# Patient Record
Sex: MALE | Race: WHITE | URBAN - NONMETROPOLITAN AREA
[De-identification: names, ages, dates, MRNs, and addresses within clinical notes are randomized per-mention and may not be internally consistent; named-entity substitution may affect disease eponyms.]

---

## 2019-01-09 ENCOUNTER — APPOINTMENT (RX ONLY)
Dept: URBAN - NONMETROPOLITAN AREA CLINIC 1 | Facility: CLINIC | Age: 59
Setting detail: DERMATOLOGY
End: 2019-01-09

## 2019-01-09 DIAGNOSIS — D18.0 HEMANGIOMA: ICD-10-CM

## 2019-01-09 DIAGNOSIS — D22 MELANOCYTIC NEVI: ICD-10-CM

## 2019-01-09 DIAGNOSIS — L82.1 OTHER SEBORRHEIC KERATOSIS: ICD-10-CM

## 2019-01-09 DIAGNOSIS — L73.9 FOLLICULAR DISORDER, UNSPECIFIED: ICD-10-CM

## 2019-01-09 DIAGNOSIS — L738 OTHER SPECIFIED DISEASES OF HAIR AND HAIR FOLLICLES: ICD-10-CM

## 2019-01-09 DIAGNOSIS — L81.4 OTHER MELANIN HYPERPIGMENTATION: ICD-10-CM

## 2019-01-09 DIAGNOSIS — L663 OTHER SPECIFIED DISEASES OF HAIR AND HAIR FOLLICLES: ICD-10-CM

## 2019-01-09 PROBLEM — L02.12 FURUNCLE OF NECK: Status: ACTIVE | Noted: 2019-01-09

## 2019-01-09 PROBLEM — I10 ESSENTIAL (PRIMARY) HYPERTENSION: Status: ACTIVE | Noted: 2019-01-09

## 2019-01-09 PROBLEM — D18.01 HEMANGIOMA OF SKIN AND SUBCUTANEOUS TISSUE: Status: ACTIVE | Noted: 2019-01-09

## 2019-01-09 PROBLEM — D22.5 MELANOCYTIC NEVI OF TRUNK: Status: ACTIVE | Noted: 2019-01-09

## 2019-01-09 PROCEDURE — ? OBSERVATION AND MEASURE

## 2019-01-09 PROCEDURE — 99213 OFFICE O/P EST LOW 20 MIN: CPT

## 2019-01-09 PROCEDURE — ? COUNSELING

## 2019-01-09 PROCEDURE — ? ADDITIONAL NOTES

## 2019-01-09 ASSESSMENT — LOCATION ZONE DERM
LOCATION ZONE: TRUNK
LOCATION ZONE: ARM
LOCATION ZONE: NECK

## 2019-01-09 ASSESSMENT — LOCATION SIMPLE DESCRIPTION DERM
LOCATION SIMPLE: POSTERIOR NECK
LOCATION SIMPLE: RIGHT SHOULDER
LOCATION SIMPLE: LOWER BACK
LOCATION SIMPLE: LEFT LOWER BACK
LOCATION SIMPLE: LEFT UPPER BACK

## 2019-01-09 ASSESSMENT — LOCATION DETAILED DESCRIPTION DERM
LOCATION DETAILED: RIGHT POSTERIOR NECK
LOCATION DETAILED: LEFT MID-UPPER BACK
LOCATION DETAILED: LEFT SUPERIOR LATERAL LOWER BACK
LOCATION DETAILED: LEFT MEDIAL UPPER BACK
LOCATION DETAILED: LEFT SUPERIOR UPPER BACK
LOCATION DETAILED: LEFT SUPERIOR MEDIAL MIDBACK
LOCATION DETAILED: RIGHT ANTERIOR SHOULDER
LOCATION DETAILED: RIGHT SUPERIOR POSTERIOR NECK
LOCATION DETAILED: SUPERIOR LUMBAR SPINE

## 2019-02-21 ENCOUNTER — APPOINTMENT (RX ONLY)
Dept: URBAN - NONMETROPOLITAN AREA CLINIC 1 | Facility: CLINIC | Age: 59
Setting detail: DERMATOLOGY
End: 2019-02-21

## 2019-02-21 DIAGNOSIS — D18.0 HEMANGIOMA: ICD-10-CM

## 2019-02-21 PROBLEM — D18.01 HEMANGIOMA OF SKIN AND SUBCUTANEOUS TISSUE: Status: ACTIVE | Noted: 2019-02-21

## 2019-02-21 PROCEDURE — ? DIOLITE 532 LASER

## 2019-02-21 ASSESSMENT — LOCATION SIMPLE DESCRIPTION DERM
LOCATION SIMPLE: LEFT CHEEK
LOCATION SIMPLE: RIGHT CHEEK

## 2019-02-21 ASSESSMENT — LOCATION DETAILED DESCRIPTION DERM
LOCATION DETAILED: RIGHT CENTRAL MALAR CHEEK
LOCATION DETAILED: LEFT CENTRAL MALAR CHEEK

## 2019-02-21 ASSESSMENT — LOCATION ZONE DERM: LOCATION ZONE: FACE

## 2020-03-16 ENCOUNTER — APPOINTMENT (RX ONLY)
Dept: URBAN - NONMETROPOLITAN AREA CLINIC 1 | Facility: CLINIC | Age: 60
Setting detail: DERMATOLOGY
End: 2020-03-16

## 2020-03-16 DIAGNOSIS — L82.1 OTHER SEBORRHEIC KERATOSIS: ICD-10-CM

## 2020-03-16 DIAGNOSIS — D18.0 HEMANGIOMA: ICD-10-CM

## 2020-03-16 DIAGNOSIS — L81.4 OTHER MELANIN HYPERPIGMENTATION: ICD-10-CM

## 2020-03-16 DIAGNOSIS — D22 MELANOCYTIC NEVI: ICD-10-CM

## 2020-03-16 PROBLEM — D18.01 HEMANGIOMA OF SKIN AND SUBCUTANEOUS TISSUE: Status: ACTIVE | Noted: 2020-03-16

## 2020-03-16 PROBLEM — D22.72 MELANOCYTIC NEVI OF LEFT LOWER LIMB, INCLUDING HIP: Status: ACTIVE | Noted: 2020-03-16

## 2020-03-16 PROBLEM — D22.5 MELANOCYTIC NEVI OF TRUNK: Status: ACTIVE | Noted: 2020-03-16

## 2020-03-16 PROCEDURE — ? OBSERVATION AND MEASURE

## 2020-03-16 PROCEDURE — ? FULL BODY SKIN EXAM

## 2020-03-16 PROCEDURE — ? COUNSELING

## 2020-03-16 PROCEDURE — 99213 OFFICE O/P EST LOW 20 MIN: CPT

## 2020-03-16 ASSESSMENT — LOCATION ZONE DERM
LOCATION ZONE: TRUNK
LOCATION ZONE: LEG

## 2020-03-16 ASSESSMENT — LOCATION DETAILED DESCRIPTION DERM
LOCATION DETAILED: LEFT ANTERIOR PROXIMAL THIGH
LOCATION DETAILED: LEFT ANTERIOR DISTAL THIGH
LOCATION DETAILED: LEFT MID-UPPER BACK
LOCATION DETAILED: LEFT MEDIAL UPPER BACK
LOCATION DETAILED: LEFT SUPERIOR UPPER BACK

## 2020-03-16 ASSESSMENT — LOCATION SIMPLE DESCRIPTION DERM
LOCATION SIMPLE: LEFT UPPER BACK
LOCATION SIMPLE: LEFT THIGH

## 2021-03-15 ENCOUNTER — APPOINTMENT (RX ONLY)
Dept: URBAN - NONMETROPOLITAN AREA CLINIC 1 | Facility: CLINIC | Age: 61
Setting detail: DERMATOLOGY
End: 2021-03-15

## 2021-03-15 DIAGNOSIS — L81.4 OTHER MELANIN HYPERPIGMENTATION: ICD-10-CM | Status: WELL CONTROLLED

## 2021-03-15 DIAGNOSIS — B07.8 OTHER VIRAL WARTS: ICD-10-CM | Status: WORSENING

## 2021-03-15 DIAGNOSIS — D22 MELANOCYTIC NEVI: ICD-10-CM | Status: WELL CONTROLLED

## 2021-03-15 DIAGNOSIS — D18.0 HEMANGIOMA: ICD-10-CM | Status: WELL CONTROLLED

## 2021-03-15 DIAGNOSIS — L57.0 ACTINIC KERATOSIS: ICD-10-CM | Status: WORSENING

## 2021-03-15 DIAGNOSIS — L82.1 OTHER SEBORRHEIC KERATOSIS: ICD-10-CM | Status: WELL CONTROLLED

## 2021-03-15 PROBLEM — D22.5 MELANOCYTIC NEVI OF TRUNK: Status: ACTIVE | Noted: 2021-03-15

## 2021-03-15 PROBLEM — D18.01 HEMANGIOMA OF SKIN AND SUBCUTANEOUS TISSUE: Status: ACTIVE | Noted: 2021-03-15

## 2021-03-15 PROCEDURE — ? PRESCRIPTION MEDICATION MANAGEMENT

## 2021-03-15 PROCEDURE — 17000 DESTRUCT PREMALG LESION: CPT

## 2021-03-15 PROCEDURE — ? ADDITIONAL NOTES

## 2021-03-15 PROCEDURE — ? OBSERVATION AND MEASURE

## 2021-03-15 PROCEDURE — 17003 DESTRUCT PREMALG LES 2-14: CPT

## 2021-03-15 PROCEDURE — ? COUNSELING

## 2021-03-15 PROCEDURE — ? LIQUID NITROGEN

## 2021-03-15 PROCEDURE — 99214 OFFICE O/P EST MOD 30 MIN: CPT | Mod: 25

## 2021-03-15 PROCEDURE — ? TREATMENT REGIMEN

## 2021-03-15 ASSESSMENT — LOCATION DETAILED DESCRIPTION DERM
LOCATION DETAILED: RIGHT ANTERIOR SHOULDER
LOCATION DETAILED: LEFT SUPERIOR UPPER BACK
LOCATION DETAILED: RIGHT ANTERIOR LATERAL DISTAL UPPER ARM
LOCATION DETAILED: LEFT INFERIOR LATERAL MIDBACK
LOCATION DETAILED: LEFT MID-UPPER BACK
LOCATION DETAILED: RIGHT PROXIMAL LATERAL POSTERIOR UPPER ARM
LOCATION DETAILED: LEFT PLANTAR FOREFOOT OVERLYING 5TH METATARSAL
LOCATION DETAILED: LEFT MEDIAL UPPER BACK

## 2021-03-15 ASSESSMENT — LOCATION SIMPLE DESCRIPTION DERM
LOCATION SIMPLE: LEFT PLANTAR SURFACE
LOCATION SIMPLE: LEFT UPPER BACK
LOCATION SIMPLE: RIGHT UPPER ARM
LOCATION SIMPLE: LEFT LOWER BACK
LOCATION SIMPLE: RIGHT SHOULDER

## 2021-03-15 ASSESSMENT — LOCATION ZONE DERM
LOCATION ZONE: FEET
LOCATION ZONE: TRUNK
LOCATION ZONE: ARM

## 2021-03-15 NOTE — PROCEDURE: PRESCRIPTION MEDICATION MANAGEMENT
Initiate Treatment: Compounded wart prescription sent into the [facility] pharmacy
Detail Level: Zone
Render In Strict Bullet Format?: No

## 2021-03-15 NOTE — PROCEDURE: OBSERVATION
Detail Level: Detailed
Size Of Lesion: 4mm
Instructions (Optional): No change in the mole since last ov

## 2022-03-28 ENCOUNTER — APPOINTMENT (RX ONLY)
Dept: URBAN - NONMETROPOLITAN AREA CLINIC 1 | Facility: CLINIC | Age: 62
Setting detail: DERMATOLOGY
End: 2022-03-28

## 2022-03-28 DIAGNOSIS — D22 MELANOCYTIC NEVI: ICD-10-CM | Status: WELL CONTROLLED

## 2022-03-28 DIAGNOSIS — D18.0 HEMANGIOMA: ICD-10-CM

## 2022-03-28 DIAGNOSIS — L82.1 OTHER SEBORRHEIC KERATOSIS: ICD-10-CM

## 2022-03-28 DIAGNOSIS — D485 NEOPLASM OF UNCERTAIN BEHAVIOR OF SKIN: ICD-10-CM

## 2022-03-28 DIAGNOSIS — L81.4 OTHER MELANIN HYPERPIGMENTATION: ICD-10-CM

## 2022-03-28 PROBLEM — D48.5 NEOPLASM OF UNCERTAIN BEHAVIOR OF SKIN: Status: ACTIVE | Noted: 2022-03-28

## 2022-03-28 PROBLEM — D18.01 HEMANGIOMA OF SKIN AND SUBCUTANEOUS TISSUE: Status: ACTIVE | Noted: 2022-03-28

## 2022-03-28 PROBLEM — D22.5 MELANOCYTIC NEVI OF TRUNK: Status: ACTIVE | Noted: 2022-03-28

## 2022-03-28 PROCEDURE — 11103 TANGNTL BX SKIN EA SEP/ADDL: CPT

## 2022-03-28 PROCEDURE — ? BIOPSY BY SHAVE METHOD

## 2022-03-28 PROCEDURE — ? ADDITIONAL NOTES

## 2022-03-28 PROCEDURE — 99213 OFFICE O/P EST LOW 20 MIN: CPT | Mod: 25

## 2022-03-28 PROCEDURE — ? TREATMENT REGIMEN

## 2022-03-28 PROCEDURE — 11102 TANGNTL BX SKIN SINGLE LES: CPT

## 2022-03-28 PROCEDURE — ? COUNSELING

## 2022-03-28 ASSESSMENT — LOCATION DETAILED DESCRIPTION DERM
LOCATION DETAILED: LEFT MEDIAL UPPER BACK
LOCATION DETAILED: LEFT SUPERIOR UPPER BACK
LOCATION DETAILED: SUPERIOR THORACIC SPINE
LOCATION DETAILED: LEFT LATERAL INFERIOR CHEST
LOCATION DETAILED: LEFT MID-UPPER BACK
LOCATION DETAILED: STERNUM

## 2022-03-28 ASSESSMENT — LOCATION ZONE DERM: LOCATION ZONE: TRUNK

## 2022-03-28 ASSESSMENT — LOCATION SIMPLE DESCRIPTION DERM
LOCATION SIMPLE: LEFT UPPER BACK
LOCATION SIMPLE: UPPER BACK
LOCATION SIMPLE: CHEST

## 2022-03-28 NOTE — PROCEDURE: BIOPSY BY SHAVE METHOD
Body Location Override (Optional - Billing Will Still Be Based On Selected Body Map Location If Applicable): mid chest
Detail Level: Detailed
Depth Of Biopsy: dermis
Was A Bandage Applied: Yes
Size Of Lesion In Cm: 0.3
X Size Of Lesion In Cm: 0
Biopsy Type: H and E
Biopsy Method: 15 blade
Anesthesia Type: 2% lidocaine with epinephrine and a 1:10 solution of 8.4% sodium bicarbonate
Anesthesia Volume In Cc (Will Not Render If 0): 3
Hemostasis: Silver Nitrate
Wound Care: Petrolatum
Dressing: bandage
Type Of Destruction Used: Curettage
Curettage Text: The wound bed was treated with curettage after the biopsy was performed.
Cryotherapy Text: The wound bed was treated with cryotherapy after the biopsy was performed.
Electrodesiccation Text: The wound bed was treated with electrodesiccation after the biopsy was performed.
Electrodesiccation And Curettage Text: The wound bed was treated with electrodesiccation and curettage after the biopsy was performed.
Silver Nitrate Text: The wound bed was treated with silver nitrate after the biopsy was performed.
Render Path Notes In Note?: No
Consent: Written consent was obtained and risks were reviewed per signed consent form.  Biopsy was performed with patient's verbal permission.
Post-Care Instructions: Detailed post-care instructions were given and reviewed. Patient is to keep the biopsy site dry overnight, and then apply healing ointment  daily until healed. Patient was instructed to call the office if any questions or problems.
Notification Instructions: Patient will be notified of biopsy results. However, patient instructed to call the office if not contacted within 2 weeks.
Billing Type: Third-Party Bill
Information: Selecting Yes will display possible errors in your note based on the variables you have selected. This validation is only offered as a suggestion for you. PLEASE NOTE THAT THE VALIDATION TEXT WILL BE REMOVED WHEN YOU FINALIZE YOUR NOTE. IF YOU WANT TO FAX A PRELIMINARY NOTE YOU WILL NEED TO TOGGLE THIS TO 'NO' IF YOU DO NOT WANT IT IN YOUR FAXED NOTE.
Body Location Override (Optional - Billing Will Still Be Based On Selected Body Map Location If Applicable): left breast
Size Of Lesion In Cm: 0.6